# Patient Record
Sex: MALE | Race: WHITE | NOT HISPANIC OR LATINO | Employment: OTHER | URBAN - METROPOLITAN AREA
[De-identification: names, ages, dates, MRNs, and addresses within clinical notes are randomized per-mention and may not be internally consistent; named-entity substitution may affect disease eponyms.]

---

## 2023-11-07 ENCOUNTER — OFFICE VISIT (OUTPATIENT)
Dept: URGENT CARE | Facility: CLINIC | Age: 53
End: 2023-11-07
Payer: COMMERCIAL

## 2023-11-07 VITALS
BODY MASS INDEX: 25.05 KG/M2 | SYSTOLIC BLOOD PRESSURE: 161 MMHG | OXYGEN SATURATION: 97 % | TEMPERATURE: 98 F | DIASTOLIC BLOOD PRESSURE: 77 MMHG | HEART RATE: 78 BPM | WEIGHT: 175 LBS | RESPIRATION RATE: 16 BRPM | HEIGHT: 70 IN

## 2023-11-07 DIAGNOSIS — S30.861A TICK BITE OF ABDOMEN, INITIAL ENCOUNTER: Primary | ICD-10-CM
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
## 2023-11-07 PROCEDURE — 99213 OFFICE O/P EST LOW 20 MIN: CPT | Performed by: NURSE PRACTITIONER

## 2023-11-07 RX ORDER — DOXYCYCLINE 100 MG/1
CAPSULE ORAL
Qty: 2 CAPSULE | Refills: 0 | Status: SHIPPED | OUTPATIENT
Start: 2023-11-07

## 2023-11-07 RX ORDER — OMEPRAZOLE 20 MG/1
TABLET, DELAYED RELEASE ORAL
COMMUNITY

## 2023-11-07 NOTE — PROGRESS NOTES
53 y.o. male presents for tick removal from abdomen below umbilicus that patient noticed today. Denies redness, swelling, or drainage. No fever, fatigue, body aches, headache or any other associated symptoms.      Vitals:    11/07/23 1521   BP: 161/77   Pulse: 78   Resp: 16   Temp: 36.7 °C (98 °F)   SpO2: 97%       No Known Allergies    Medication Documentation Review Audit       Reviewed by SEBASTIAN Sheppard (Nurse Practitioner) on 11/07/23 at 1537      Medication Order Taking? Sig Documenting Provider Last Dose Status   doxycycline (Monodox) 100 mg capsule [id number]  Take two capsules by mouth once today. SEBASTIAN Sheppard  Active   omeprazole OTC (PriLOSEC OTC) 20 mg EC tablet [id number]  Take by mouth once daily. Historical Provider, MD  Active                    History reviewed. No pertinent past medical history.    History reviewed. No pertinent surgical history.    ROS  See HPI    Physical Exam  Vitals and nursing note reviewed.   Constitutional:       General: He is not in acute distress.     Appearance: Normal appearance. He is normal weight. He is not ill-appearing or toxic-appearing.   HENT:      Head: Normocephalic and atraumatic.   Skin:     General: Skin is warm and dry.      Capillary Refill: Capillary refill takes less than 2 seconds.      Comments: Tick noted below umbilicus without EM rash, erythema or edema.   Neurological:      General: No focal deficit present.      Mental Status: He is alert and oriented to person, place, and time.   Psychiatric:         Mood and Affect: Mood normal.         Behavior: Behavior normal.       Area cleansed with alcohol swab and tick removed without difficulty fully intact. Pt tolerated well.    Assessment/Plan/MDM  Aleksey was seen today for tick removal.  Diagnoses and all orders for this visit:  Tick bite of abdomen, initial encounter (Primary)  -     doxycycline (Monodox) 100 mg capsule; Take two capsules by mouth once today.    Advised pt to  monitor for EM or infection symptoms.  Patient's clinical presentation is otherwise unremarkable at this time. Patient is discharged with instructions to follow-up with primary care or seek emergency medical attention for worsening symptoms or any new concerns.    Jaydon Braun CNP  [facility]  [phone]